# Patient Record
Sex: MALE | Race: WHITE | NOT HISPANIC OR LATINO | Employment: FULL TIME | URBAN - METROPOLITAN AREA
[De-identification: names, ages, dates, MRNs, and addresses within clinical notes are randomized per-mention and may not be internally consistent; named-entity substitution may affect disease eponyms.]

---

## 2023-10-16 ENCOUNTER — OFFICE VISIT (OUTPATIENT)
Dept: PRIMARY CARE | Facility: CLINIC | Age: 48
End: 2023-10-16
Payer: COMMERCIAL

## 2023-10-16 VITALS — BODY MASS INDEX: 24.22 KG/M2 | WEIGHT: 173 LBS | HEIGHT: 71 IN

## 2023-10-16 DIAGNOSIS — K11.5 SIALOLITHIASIS: ICD-10-CM

## 2023-10-16 DIAGNOSIS — F31.9 BIPOLAR AND RELATED DISORDER (MULTI): Primary | ICD-10-CM

## 2023-10-16 DIAGNOSIS — R59.0 SUBMANDIBULAR LYMPHADENOPATHY: ICD-10-CM

## 2023-10-16 DIAGNOSIS — B35.4 TINEA CORPORIS: ICD-10-CM

## 2023-10-16 PROCEDURE — 99214 OFFICE O/P EST MOD 30 MIN: CPT | Performed by: INTERNAL MEDICINE

## 2023-10-16 PROCEDURE — 1036F TOBACCO NON-USER: CPT | Performed by: INTERNAL MEDICINE

## 2023-10-16 RX ORDER — DOXYCYCLINE 100 MG/1
100 CAPSULE ORAL 2 TIMES DAILY
Qty: 20 CAPSULE | Refills: 0 | Status: SHIPPED | OUTPATIENT
Start: 2023-10-16 | End: 2023-10-26

## 2023-10-16 RX ORDER — KETOCONAZOLE 20 MG/G
CREAM TOPICAL 2 TIMES DAILY
Qty: 15 G | Refills: 1 | Status: SHIPPED | OUTPATIENT
Start: 2023-10-16 | End: 2024-10-15

## 2023-10-16 ASSESSMENT — ENCOUNTER SYMPTOMS
DEPRESSION: 0
OCCASIONAL FEELINGS OF UNSTEADINESS: 0
LOSS OF SENSATION IN FEET: 0

## 2023-10-16 NOTE — PROGRESS NOTES
Subjective   Jatin Rivera is a 48 y.o. male who presents for complaining of lymph nodes behind ear.  HPI  Rui is 48, who is relatively healthy takes no medications for follow-up complaining of lymph nodes behind the Rt ear under jaw swelling of jaw area, ear pain, denies fever chills cough nausea vomiting constipation diarrhea dysuria urgency frequency tried over-the-counter medication without success.  Review of Systems  10 systems are pertinent as above  Objective     There were no vitals taken for this visit.   Physical Exam  HEENT: Atraumatic normocephalic the pupils are equal and round and reactive to light the sclerae nonicteric extraocular motion are intact.  Neck: Is supple without JVD no carotid bruits the trachea is midline there are no masses pulses are equal and bilateral with normal upstroke.  Skin: Normal.  Skin good texture.  Moist.  Good turgor.  No lesions, no rashes.  Lymph: No lymphadenopathy appreciated, no masses, no lesions  Lungs: Are clear to auscultation and percussion, good breath sounds bilaterally, no rhonchi, no wheezing, good diaphragmatic excursion.  Heart: Normal rate and normal rhythm S1, S2, no S3, no gallop, murmur or rub.  Abdomen: Soft, nontender, no organomegaly, good bowel sounds.    Extremities: Full range of motion, good pulses bilateral.  No cyanosis, no clubbing or edema.  Neuro: Cranial nerves II-XII are grossly intact there is no sensory or motor deficits.  Able to move all extremities.      Assessment/Plan     I suspect sialolithiasis  Will start on lemon squares fluids  Try to extract salivary stone by creating increase saliva    Submandibular Lymphadenopathy behind  In the setting of otalgia    Tinea corporis      Will need to come in for a physical     Problem List Items Addressed This Visit       Bipolar and related disorder (CMS/HCC) - Primary     Other Visit Diagnoses       Submandibular lymphadenopathy        Relevant Orders    Referral to ENT     Sialolithiasis        Relevant Orders    Referral to ENT              Kin Baptiste MD

## 2023-10-31 ENCOUNTER — APPOINTMENT (OUTPATIENT)
Dept: PRIMARY CARE | Facility: CLINIC | Age: 48
End: 2023-10-31
Payer: COMMERCIAL

## 2023-11-17 ENCOUNTER — APPOINTMENT (OUTPATIENT)
Dept: OTOLARYNGOLOGY | Facility: HOSPITAL | Age: 48
End: 2023-11-17
Payer: COMMERCIAL

## 2023-12-04 NOTE — PROGRESS NOTES
History of Present Illness    Jatin Rivera is a 48 y.o. male who is seen at the request of his family physician.  Approximately 2 or 3 weeks ago he had some swelling in the right preauricular area.  It has since almost completely disappeared.  He also has some discomfort around his right ear.  He describes some itchiness around the area.      Past Medical History    His past medical history and review the system is basically completely negative.  He does not take any medicine.  He does not smoke.  He drinks minimally.  He works in the music industry in sales.  He is here alone today.  Physical Exam    The patient is alert and oriented. Examination of the external ears, ear canals, and eardrums, is negative except for some small irritation in the right kathie. Examination of the anterior and external nose is negative. Examination of the oral cavity and oropharynx is normal. There is no evidence of any mucosal lesions.  He does have some dryness.  I was eventually able to get some saliva out of the right parotid duct.  I cannot appreciate any stones.  There is good mobility of the tongue and palate. There is good mandibular excursion. Palpation of the parotid, neck, and thyroid field is negative except for a very small adenopathy in the right level 2.    A flexible laryngoscopy was carried out. Under topical Xylocaine and Sky-Synephrine the scope was introduced through the nostril. The nasopharynx, base of tongue, hypopharynx, and larynx are visualized.  The vocal cords are normally mobile. There is no pooling of secretions in the piriform sinuses. There is no evidence of any mucosal lesions.    Assessment and Plan    Recent swelling of the parotid.  It may have been secondary to dryness.  I instructed the patient proper hydration and the use of sialagogues.  If that continues to be an issue he will need to be scanned.    Irritation of the external ear around the kathie.  I told the patient to use over-the-counter  steroid ointment.    I will see him in 2 months.

## 2023-12-05 ENCOUNTER — OFFICE VISIT (OUTPATIENT)
Dept: OTOLARYNGOLOGY | Facility: HOSPITAL | Age: 48
End: 2023-12-05
Payer: COMMERCIAL

## 2023-12-05 VITALS — BODY MASS INDEX: 24.65 KG/M2 | TEMPERATURE: 96.6 F | WEIGHT: 176.1 LBS | HEIGHT: 71 IN

## 2023-12-05 DIAGNOSIS — R22.1 NECK MASS: Primary | ICD-10-CM

## 2023-12-05 DIAGNOSIS — R59.0 SUBMANDIBULAR LYMPHADENOPATHY: ICD-10-CM

## 2023-12-05 DIAGNOSIS — K11.5 SIALOLITHIASIS: ICD-10-CM

## 2023-12-05 PROCEDURE — 99213 OFFICE O/P EST LOW 20 MIN: CPT | Performed by: OTOLARYNGOLOGY

## 2023-12-05 PROCEDURE — 1036F TOBACCO NON-USER: CPT | Performed by: OTOLARYNGOLOGY

## 2023-12-05 PROCEDURE — 31575 DIAGNOSTIC LARYNGOSCOPY: CPT | Performed by: OTOLARYNGOLOGY

## 2023-12-05 PROCEDURE — 99203 OFFICE O/P NEW LOW 30 MIN: CPT | Performed by: OTOLARYNGOLOGY

## 2023-12-05 ASSESSMENT — ENCOUNTER SYMPTOMS
HEADACHES: 0
VOMITING: 0
RHINORRHEA: 0
SORE THROAT: 0
COUGH: 0
ABDOMINAL PAIN: 0
NECK PAIN: 0
DIARRHEA: 0

## 2023-12-05 ASSESSMENT — PATIENT HEALTH QUESTIONNAIRE - PHQ9
SUM OF ALL RESPONSES TO PHQ9 QUESTIONS 1 & 2: 0
2. FEELING DOWN, DEPRESSED OR HOPELESS: NOT AT ALL
1. LITTLE INTEREST OR PLEASURE IN DOING THINGS: NOT AT ALL

## 2024-02-16 ENCOUNTER — APPOINTMENT (OUTPATIENT)
Dept: OTOLARYNGOLOGY | Facility: HOSPITAL | Age: 49
End: 2024-02-16
Payer: COMMERCIAL

## 2024-04-18 NOTE — PROGRESS NOTES
History of Present Illness    Jatin Rivera was seen in December 2024 at the request of his family physician.   2 or 3 weeks previously he had some swelling in the right preauricular area.  It has since almost completely disappeared.  Since I last saw him he has not had any recurrent swelling but he still feels a little asymmetry behind the jaw on the right side.  He denies any pain or discomfort.  He also described to me some issues with his ears peeling every other month or so    Physical Exam    The ear examination shows a little bit of dryness especially on the right.  Examination of the oral cavity and oropharynx is normal. There is no evidence of any mucosal lesions.  He does have some dryness.  I was eventually able to get some saliva out of the right parotid duct.  I cannot appreciate any stones.  There is good mobility of the tongue and palate. There is good mandibular excursion. Palpation of the parotid, neck, and thyroid field is negative.  I really cannot appreciate any nodularity.    Assessment and Plan    Recent swelling of the parotid.  It may have been secondary to dryness.  I instructed the patient proper hydration and the use of sialagogues.  He still has the feeling that it is a little bit more prominent on the right than the left.  I cannot appreciate any obvious nodularity.  We discussed the possibility of getting a scan but the patient does not feel that this is necessary.  He is to call me back if he is concerned about something.    Issues with dryness around the external ears.  I instructed him to use some skin lotion.    I will see him on a as needed basis.

## 2024-04-19 ENCOUNTER — OFFICE VISIT (OUTPATIENT)
Dept: OTOLARYNGOLOGY | Facility: HOSPITAL | Age: 49
End: 2024-04-19
Payer: COMMERCIAL

## 2024-04-19 VITALS — WEIGHT: 177 LBS | BODY MASS INDEX: 24.78 KG/M2 | HEIGHT: 71 IN

## 2024-04-19 DIAGNOSIS — R22.1 NECK MASS: ICD-10-CM

## 2024-04-19 DIAGNOSIS — K11.5 SIALOLITHIASIS: Primary | ICD-10-CM

## 2024-04-19 PROCEDURE — 99213 OFFICE O/P EST LOW 20 MIN: CPT | Performed by: OTOLARYNGOLOGY

## 2024-04-19 PROCEDURE — 1036F TOBACCO NON-USER: CPT | Performed by: OTOLARYNGOLOGY

## 2024-04-19 ASSESSMENT — PATIENT HEALTH QUESTIONNAIRE - PHQ9
1. LITTLE INTEREST OR PLEASURE IN DOING THINGS: NOT AT ALL
2. FEELING DOWN, DEPRESSED OR HOPELESS: NOT AT ALL
SUM OF ALL RESPONSES TO PHQ9 QUESTIONS 1 & 2: 0

## 2025-02-04 ENCOUNTER — TELEMEDICINE (OUTPATIENT)
Dept: PRIMARY CARE | Facility: CLINIC | Age: 50
End: 2025-02-04
Payer: COMMERCIAL

## 2025-02-04 DIAGNOSIS — F31.9 BIPOLAR AND RELATED DISORDER (MULTI): ICD-10-CM

## 2025-02-04 DIAGNOSIS — J20.9 ACUTE BRONCHITIS, UNSPECIFIED ORGANISM: Primary | ICD-10-CM

## 2025-02-04 DIAGNOSIS — J01.10 ACUTE NON-RECURRENT FRONTAL SINUSITIS: ICD-10-CM

## 2025-02-04 PROCEDURE — 99213 OFFICE O/P EST LOW 20 MIN: CPT | Performed by: INTERNAL MEDICINE

## 2025-02-04 RX ORDER — AZITHROMYCIN 250 MG/1
TABLET, FILM COATED ORAL
Qty: 6 TABLET | Refills: 0 | Status: SHIPPED | OUTPATIENT
Start: 2025-02-04 | End: 2025-02-09

## 2025-02-04 NOTE — PROGRESS NOTES
Subjective   Jatin Rivera is a 49 y.o. male who presents for a virtual visit, complaining of cough productive.    HPI  Rui is 49, who is relatively healthy takes no medications for follow-up complaining of lymph nodes behind the Rt ear under jaw swelling of jaw area, ear pain, denies fever chills cough nausea vomiting constipation diarrhea dysuria urgency frequency tried over-the-counter medication without success.  Patient complaining of postnasal drip and a cough for last 5 days, is feeling somewhat better except for slight congestion no fever no chills no diaphoresis no loss of smell or taste.  Review of Systems  10 systems are pertinent as above  Objective   Virtual visit  There were no vitals taken for this visit.   Physical Exam  Virtual visit  Assessment/Plan     Virtual visit    Suspect acute bronchitis  Due to bacterial infection  Still symptomatic with cough and production  Will start azithromycin as directed  Fluids and rest  Call if not better    Acute sinusitis frontal   Suspect probably the source  With postnasal drip  Improving  Supportive care at this time    I suspect sialolithiasis  Will start on lemon squares fluids  Try to extract salivary stone by creating increase saliva    Submandibular Lymphadenopathy behind  In the setting of otalgia    Tinea corporis      Will need to come in for a physical     Problem List Items Addressed This Visit       RESOLVED: Bipolar and related disorder (Multi)    RESOLVED: Acute bronchitis - Primary    Relevant Medications    azithromycin (Zithromax) 250 mg tablet    Acute non-recurrent frontal sinusitis         Kin Baptiste MD

## 2025-08-06 DIAGNOSIS — Z12.12 SCREENING FOR COLORECTAL CANCER: ICD-10-CM

## 2025-08-06 DIAGNOSIS — Z12.11 SCREENING FOR COLORECTAL CANCER: ICD-10-CM
